# Patient Record
Sex: FEMALE | Race: WHITE | NOT HISPANIC OR LATINO | Employment: PART TIME | URBAN - METROPOLITAN AREA
[De-identification: names, ages, dates, MRNs, and addresses within clinical notes are randomized per-mention and may not be internally consistent; named-entity substitution may affect disease eponyms.]

---

## 2024-02-16 ENCOUNTER — OFFICE VISIT (OUTPATIENT)
Dept: URGENT CARE | Facility: CLINIC | Age: 44
End: 2024-02-16
Payer: COMMERCIAL

## 2024-02-16 VITALS
HEART RATE: 64 BPM | RESPIRATION RATE: 20 BRPM | SYSTOLIC BLOOD PRESSURE: 102 MMHG | WEIGHT: 130 LBS | DIASTOLIC BLOOD PRESSURE: 60 MMHG | TEMPERATURE: 97.5 F | OXYGEN SATURATION: 100 %

## 2024-02-16 DIAGNOSIS — J06.9 ACUTE URI: ICD-10-CM

## 2024-02-16 DIAGNOSIS — J02.9 PHARYNGITIS, UNSPECIFIED ETIOLOGY: Primary | ICD-10-CM

## 2024-02-16 LAB — S PYO AG THROAT QL: NEGATIVE

## 2024-02-16 PROCEDURE — 99213 OFFICE O/P EST LOW 20 MIN: CPT | Performed by: PHYSICIAN ASSISTANT

## 2024-02-16 PROCEDURE — 87880 STREP A ASSAY W/OPTIC: CPT | Performed by: PHYSICIAN ASSISTANT

## 2024-02-16 RX ORDER — DEXTROMETHORPHAN HYDROBROMIDE AND PROMETHAZINE HYDROCHLORIDE 15; 6.25 MG/5ML; MG/5ML
SYRUP ORAL
Qty: 120 ML | Refills: 0 | Status: SHIPPED | OUTPATIENT
Start: 2024-02-16

## 2024-02-16 RX ORDER — BUPROPION HYDROCHLORIDE 150 MG/1
TABLET, EXTENDED RELEASE ORAL
COMMUNITY
Start: 2023-11-09

## 2024-02-16 RX ORDER — METHYLPREDNISOLONE 4 MG/1
TABLET ORAL
Qty: 1 EACH | Refills: 0 | Status: SHIPPED | OUTPATIENT
Start: 2024-02-16

## 2024-02-16 NOTE — PROGRESS NOTES
[facility]    NAME: Aneta Pickett is a 43 y.o. female  : 1980    MRN: [id number]  DATE: 2024  TIME: 4:18 PM    Assessment and Plan   Pharyngitis, unspecified etiology [J02.9]  1. Pharyngitis, unspecified etiology  POCT rapid strepA    methylPREDNISolone 4 MG tablet therapy pack      2. Acute URI  promethazine-dextromethorphan (PHENERGAN-DM) 6.25-15 mg/5 mL oral syrup          Patient Instructions     Patient Instructions   Rapid strep test is negative.    Prescription cough medicine for home use only.    Take prednisone as instructed.  While on prednisone do not take any ibuprofen or ibuprofen like products.  May safely take Tylenol if needed.    This appears to be viral illness and an antibiotic is not indicated at this time.    There are a number of viral respiratory illnesses that can present similarly.  Most are self-limiting.  Antibiotics do not help viral illnesses.       Most upper respiratory symptoms start to improve after 7-12 days but may take a few weeks to completely resolve.        As with any respiratory illness, transmission precautions  are strongly advised.  Masking.  Isolating.  Hand washing.  Frequent cleaning of common use surfaces.      Follow up with your PCP office if not improving over next 7-10 days.  If you want to be proactive, you may contact your PCP office now to schedule follow up for near future.    If you feel like your are severely worsening or have significant weakness, chest pain, shortness of breath proceed to ER for immediate further evaluation.  ---------------------------------------------------------------------------------------------------------------------------------------------------------------------------------------------------------------------------------------------------------------    Strongly encourage getting plenty of rest over the next few days.  Increase your hydration.    Vaporizer by bedside may also be helpful.         Symptom Relief Suggestions:    Options for sore throat or hoarseness:              * Warm salt water gargles every 1-2 hours while awake        * Throat lozenges, Tylenol, voice rest, warm tea with honey.    Options for sinus pressure, nasal congestion, runny nose, and / or post nasal drip:            * Clearing your sinuses in a nice steamy shower may be helpful, especially first thing after waking.       * Nasal saline rinses every 1-2 hours while awake may also help decrease nasal congestion, drainage.       * Afrin nasal spray if significant nasal congestion at bedtime may use .  (Do not use for over 3 days however.)       * Decongestant / expectorant such as Mucinex D 12 hour 1/2 to 1 tablet as needed with full glass of fluids may help decrease pressure and drainage.    Options for ear pressure, discomfort:         * Decongestant may be helpful.       * Flonase nasal spray.       * Warm compresses against ear(s) for comfort.    Options for help with  cough:         * Warm tea with honey or a teaspoon of honey periodically throughout day and / or before bed.       * Plain Mucinex (an expectorant to help keep mucus thin so you can clear it easier) or Mucinex DM (expectorant / cough suppressant) to help decrease cough if it is bothering your sleep.        Other night time cough medication options include Delsym, Robitussin DM, NyQuil.         * Propping with an extra pillow or two may be helpful.       * Keep water by your bedside to sip on as needed.       * Cough drops.       * Vaporizer by bedside.       * Getting in steamy shower or bathroom.  Cool air may also soothe coughing spasm.      ----------------------------------------------------------------------------------------------------------------------------------------------------------------------------------------------------------------------------------------------------------------      Although bothersome, mucous is not necessarily a bad thing.   Production of mucous is the body's way of trying to capture and flush irritants from mucosal surfaces.  Yellow or green mucous does not necessarily mean you have a bacterial infection.   Mucous will become more discolored over time, especially first thing in the morning, as your body's immune system  floods the mucosal surfaces with white bloods cells to try and help fight  infection.  This white blood cell debride can also cause mucous to be discolored.  Again, using nasal saline spray frequently may help soothe and keep mucous flowing out versus getting dried, thickened and / or stuck leading to more sinus pain and pressure.     If you have a cough, please realize that a cough is not necessarily a bad thing.  It is a part of your body's protection mechanism to help keep your airways clear.  Phlegm may be more discolored in the morning.  Please note that discolored phlegm does not necessarily mean a bacterial infection.                     Chief Complaint     Chief Complaint   Patient presents with    Sore Throat     Pt C/O a sore throat with a slight cough that started Sunday.        History of Present Illness   Aneta Pickett presents to the clinic c/o  43-year-old female comes in with sore throat, cough, congestion that started on Sunday.  No fever or chills.  One of her kids has had a cold and another 1 with pinkeye.  Patient is using Advil and Benadryl.  Having some trouble sleeping.        Review of Systems   Review of Systems   Constitutional:  Positive for fatigue. Negative for activity change, appetite change, chills and fever.   HENT:  Positive for congestion, postnasal drip, rhinorrhea, sore throat and trouble swallowing. Negative for ear discharge, ear pain, sinus pressure and sinus pain.    Eyes: Negative.    Respiratory:  Positive for cough. Negative for chest tightness, shortness of breath and wheezing.    Cardiovascular: Negative.    Hematological: Negative.        Current Medications      Long-Term Medications   Medication Sig Dispense Refill    buPROPion (WELLBUTRIN SR) 150 mg 12 hr tablet TAKE 1 TABLET BY MOUTH TWICE A DAY (NEED INSURANCE)         Current Allergies     Allergies as of 02/16/2024    (No Known Allergies)          The following portions of the patient's history were reviewed and updated as appropriate: allergies, current medications, past family history, past medical history, past social history, past surgical history and problem list.  History reviewed. No pertinent past medical history.  History reviewed. No pertinent surgical history.  History reviewed. No pertinent family history.    Objective   /60 (BP Location: Left arm, Patient Position: Sitting, Cuff Size: Standard)   Pulse 64   Temp 97.5 °F (36.4 °C) (Tympanic)   Resp 20   Wt 59 kg (130 lb)   SpO2 100%   No LMP recorded.       Physical Exam     Physical Exam  Vitals and nursing note reviewed.   Constitutional:       General: She is not in acute distress.     Appearance: She is well-developed. She is ill-appearing. She is not toxic-appearing or diaphoretic.      Comments: Appears mildly ill but in no acute distress.  No trismus or conversational dyspnea.  Occasional dry cough.   HENT:      Head: Normocephalic and atraumatic.      Right Ear: Tympanic membrane, ear canal and external ear normal.      Left Ear: Tympanic membrane, ear canal and external ear normal.      Nose: Congestion and rhinorrhea present.      Mouth/Throat:      Mouth: Mucous membranes are moist. No oral lesions.      Pharynx: Uvula midline. Posterior oropharyngeal erythema present. No pharyngeal swelling, oropharyngeal exudate or uvula swelling.      Tonsils: No tonsillar exudate or tonsillar abscesses. 0 on the right. 0 on the left.      Comments: Cobblestoning posterior pharynx with patchy redness  Eyes:      General:         Right eye: No discharge.         Left eye: No discharge.      Conjunctiva/sclera: Conjunctivae normal.      Pupils:  Pupils are equal, round, and reactive to light.   Cardiovascular:      Rate and Rhythm: Normal rate and regular rhythm.      Heart sounds: Normal heart sounds. No murmur heard.     No friction rub. No gallop.   Pulmonary:      Effort: Pulmonary effort is normal. No respiratory distress.      Breath sounds: Normal breath sounds. No stridor. No wheezing, rhonchi or rales.   Musculoskeletal:      Cervical back: Normal range of motion and neck supple. No rigidity or tenderness.   Lymphadenopathy:      Cervical: No cervical adenopathy.   Skin:     General: Skin is warm and dry.      Coloration: Skin is not jaundiced or pale.      Findings: No rash.   Neurological:      Mental Status: She is alert and oriented to person, place, and time.   Psychiatric:         Mood and Affect: Mood normal.         Behavior: Behavior normal.

## 2024-02-16 NOTE — PATIENT INSTRUCTIONS
Rapid strep test is negative.    Prescription cough medicine for home use only.    Take prednisone as instructed.  While on prednisone do not take any ibuprofen or ibuprofen like products.  May safely take Tylenol if needed.    This appears to be viral illness and an antibiotic is not indicated at this time.    There are a number of viral respiratory illnesses that can present similarly.  Most are self-limiting.  Antibiotics do not help viral illnesses.       Most upper respiratory symptoms start to improve after 7-12 days but may take a few weeks to completely resolve.        As with any respiratory illness, transmission precautions  are strongly advised.  Masking.  Isolating.  Hand washing.  Frequent cleaning of common use surfaces.      Follow up with your PCP office if not improving over next 7-10 days.  If you want to be proactive, you may contact your PCP office now to schedule follow up for near future.    If you feel like your are severely worsening or have significant weakness, chest pain, shortness of breath proceed to ER for immediate further evaluation.  ---------------------------------------------------------------------------------------------------------------------------------------------------------------------------------------------------------------------------------------------------------------    Strongly encourage getting plenty of rest over the next few days.  Increase your hydration.    Vaporizer by bedside may also be helpful.        Symptom Relief Suggestions:    Options for sore throat or hoarseness:              * Warm salt water gargles every 1-2 hours while awake        * Throat lozenges, Tylenol, voice rest, warm tea with honey.    Options for sinus pressure, nasal congestion, runny nose, and / or post nasal drip:            * Clearing your sinuses in a nice steamy shower may be helpful, especially first thing after waking.       * Nasal saline rinses every 1-2 hours while awake may  also help decrease nasal congestion, drainage.       * Afrin nasal spray if significant nasal congestion at bedtime may use .  (Do not use for over 3 days however.)       * Decongestant / expectorant such as Mucinex D 12 hour 1/2 to 1 tablet as needed with full glass of fluids may help decrease pressure and drainage.    Options for ear pressure, discomfort:         * Decongestant may be helpful.       * Flonase nasal spray.       * Warm compresses against ear(s) for comfort.    Options for help with  cough:         * Warm tea with honey or a teaspoon of honey periodically throughout day and / or before bed.       * Plain Mucinex (an expectorant to help keep mucus thin so you can clear it easier) or Mucinex DM (expectorant / cough suppressant) to help decrease cough if it is bothering your sleep.        Other night time cough medication options include Delsym, Robitussin DM, NyQuil.         * Propping with an extra pillow or two may be helpful.       * Keep water by your bedside to sip on as needed.       * Cough drops.       * Vaporizer by bedside.       * Getting in steamy shower or bathroom.  Cool air may also soothe coughing spasm.      ----------------------------------------------------------------------------------------------------------------------------------------------------------------------------------------------------------------------------------------------------------------      Although bothersome, mucous is not necessarily a bad thing.  Production of mucous is the body's way of trying to capture and flush irritants from mucosal surfaces.  Yellow or green mucous does not necessarily mean you have a bacterial infection.   Mucous will become more discolored over time, especially first thing in the morning, as your body's immune system  floods the mucosal surfaces with white bloods cells to try and help fight  infection.  This white blood cell debride can also cause mucous to be discolored.  Again,  using nasal saline spray frequently may help soothe and keep mucous flowing out versus getting dried, thickened and / or stuck leading to more sinus pain and pressure.     If you have a cough, please realize that a cough is not necessarily a bad thing.  It is a part of your body's protection mechanism to help keep your airways clear.  Phlegm may be more discolored in the morning.  Please note that discolored phlegm does not necessarily mean a bacterial infection.

## 2024-12-08 ENCOUNTER — OFFICE VISIT (OUTPATIENT)
Dept: URGENT CARE | Facility: CLINIC | Age: 44
End: 2024-12-08
Payer: COMMERCIAL

## 2024-12-08 VITALS
HEART RATE: 88 BPM | OXYGEN SATURATION: 99 % | SYSTOLIC BLOOD PRESSURE: 106 MMHG | RESPIRATION RATE: 20 BRPM | TEMPERATURE: 98.2 F | DIASTOLIC BLOOD PRESSURE: 60 MMHG

## 2024-12-08 DIAGNOSIS — R30.0 DYSURIA: Primary | ICD-10-CM

## 2024-12-08 LAB
SL AMB  POCT GLUCOSE, UA: ABNORMAL
SL AMB LEUKOCYTE ESTERASE,UA: ABNORMAL
SL AMB POCT BILIRUBIN,UA: ABNORMAL
SL AMB POCT BLOOD,UA: ABNORMAL
SL AMB POCT CLARITY,UA: CLEAR
SL AMB POCT COLOR,UA: YELLOW
SL AMB POCT KETONES,UA: ABNORMAL
SL AMB POCT NITRITE,UA: ABNORMAL
SL AMB POCT PH,UA: 7
SL AMB POCT SPECIFIC GRAVITY,UA: 1
SL AMB POCT URINE HCG: NORMAL
SL AMB POCT URINE PROTEIN: ABNORMAL
SL AMB POCT UROBILINOGEN: 0.2

## 2024-12-08 PROCEDURE — 87086 URINE CULTURE/COLONY COUNT: CPT | Performed by: NURSE PRACTITIONER

## 2024-12-08 PROCEDURE — 87186 SC STD MICRODIL/AGAR DIL: CPT | Performed by: NURSE PRACTITIONER

## 2024-12-08 PROCEDURE — 99213 OFFICE O/P EST LOW 20 MIN: CPT | Performed by: NURSE PRACTITIONER

## 2024-12-08 PROCEDURE — 81002 URINALYSIS NONAUTO W/O SCOPE: CPT | Performed by: NURSE PRACTITIONER

## 2024-12-08 PROCEDURE — 81025 URINE PREGNANCY TEST: CPT | Performed by: NURSE PRACTITIONER

## 2024-12-08 RX ORDER — PHENAZOPYRIDINE HYDROCHLORIDE 200 MG/1
200 TABLET, FILM COATED ORAL
Qty: 10 TABLET | Refills: 0 | Status: SHIPPED | OUTPATIENT
Start: 2024-12-08

## 2024-12-08 RX ORDER — NITROFURANTOIN 25; 75 MG/1; MG/1
100 CAPSULE ORAL 2 TIMES DAILY
Qty: 14 CAPSULE | Refills: 0 | Status: SHIPPED | OUTPATIENT
Start: 2024-12-08 | End: 2024-12-15

## 2024-12-08 NOTE — PATIENT INSTRUCTIONS
Urine dip was positive for infection today and sent for a culture,   Call in two to three days for urine culture results    Don't wear contacts when taking AZO OTC or pyridium perscription due to side effects  Increased fluids  Take meds as directed     Empty bladder often  Follow up with pcp  If symptoms get worse, go to ER for further evaluation.

## 2024-12-08 NOTE — PROGRESS NOTES
NAME: Aneta Pickett is a 44 y.o. female  : 1980    MRN: [id number]    /60   Pulse 88   Temp 98.2 °F (36.8 °C) (Tympanic)   Resp 20   SpO2 99%     10:48 AM    Assessment and Plan   Dysuria [R30.0]  1. Dysuria  POCT urine HCG    POCT urine dip    Urine culture    Urine culture    phenazopyridine (PYRIDIUM) 200 mg tablet    nitrofurantoin (MACROBID) 100 mg capsule          Aneta was seen today for possible uti.    Diagnoses and all orders for this visit:    Dysuria  -     POCT urine HCG  -     POCT urine dip  -     Urine culture; Future  -     Urine culture  -     phenazopyridine (PYRIDIUM) 200 mg tablet; Take 1 tablet (200 mg total) by mouth 3 (three) times a day with meals  -     nitrofurantoin (MACROBID) 100 mg capsule; Take 1 capsule (100 mg total) by mouth 2 (two) times a day for 7 days        Patient Instructions     Patient Instructions   Urine dip was positive for infection today and sent for a culture,   Call in two to three days for urine culture results    Don't wear contacts when taking AZO OTC or pyridium perscription due to side effects  Increased fluids  Take meds as directed     Empty bladder often  Follow up with pcp  If symptoms get worse, go to ER for further evaluation.       Proceed to the nearest ER if symptoms worsen, Follow up with your PCP  Continue to social distance, wash your hands, and wear your masks. Please continue to follow the CDC.gov guidelines daily for they are subject to change on COVID-19    Chief Complaint     Chief Complaint   Patient presents with    Possible UTI     S/s of urgency, burning with urination. Slight discharge, no odor. Pt reports slight cramping. No fevers. S/s started FRI night. Pt hydrating, using AZO (Yesterday morning).          History of Present Illness     Pt here today with UTI symptoms, having burning with urination and urgency. Slight discharge present with no odor. Reports some cramping as well and has no fevers. Started to increase  fluids and use AZO OTC yesterday morning with some relief, symptoms started on Friday            Review of Systems   Review of Systems   Constitutional: Negative.    HENT: Negative.     Eyes: Negative.    Respiratory: Negative.     Cardiovascular: Negative.    Gastrointestinal:  Positive for abdominal pain. Negative for diarrhea.   Genitourinary:  Positive for dysuria, frequency and urgency. Negative for decreased urine volume, flank pain, menstrual problem, vaginal bleeding, vaginal discharge and vaginal pain.   Musculoskeletal: Negative.    Skin: Negative.    Psychiatric/Behavioral: Negative.           Current Medications       Current Outpatient Medications:     nitrofurantoin (MACROBID) 100 mg capsule, Take 1 capsule (100 mg total) by mouth 2 (two) times a day for 7 days, Disp: 14 capsule, Rfl: 0    phenazopyridine (PYRIDIUM) 200 mg tablet, Take 1 tablet (200 mg total) by mouth 3 (three) times a day with meals, Disp: 10 tablet, Rfl: 0    buPROPion (WELLBUTRIN SR) 150 mg 12 hr tablet, TAKE 1 TABLET BY MOUTH TWICE A DAY (NEED INSURANCE), Disp: , Rfl:     methylPREDNISolone 4 MG tablet therapy pack, Use as directed on package, Disp: 1 each, Rfl: 0    promethazine-dextromethorphan (PHENERGAN-DM) 6.25-15 mg/5 mL oral syrup, 5 mL every 4-6 hours or at bedtime as needed cough., Disp: 120 mL, Rfl: 0    Current Allergies     Allergies as of 12/08/2024    (No Known Allergies)              History reviewed. No pertinent past medical history.    History reviewed. No pertinent surgical history.    History reviewed. No pertinent family history.      Medications have been verified.    The following portions of the patient's history were reviewed and updated as appropriate: allergies, current medications, past family history, past medical history, past social history, past surgical history and problem list.    Objective   /60   Pulse 88   Temp 98.2 °F (36.8 °C) (Tympanic)   Resp 20   SpO2 99%      Physical Exam  "    Physical Exam  Constitutional:       Appearance: Normal appearance. She is well-developed.   Cardiovascular:      Rate and Rhythm: Normal rate and regular rhythm.      Heart sounds: Normal heart sounds.   Pulmonary:      Effort: Pulmonary effort is normal.      Breath sounds: Normal breath sounds.   Abdominal:      Palpations: Abdomen is soft.      Tenderness: There is abdominal tenderness in the suprapubic area. There is no right CVA tenderness, left CVA tenderness or rebound.      Hernia: No hernia is present.   Skin:     General: Skin is warm.      Capillary Refill: Capillary refill takes less than 2 seconds.      Findings: No rash.   Neurological:      Mental Status: She is alert and oriented to person, place, and time.               Note: Portions of this record may have been created with voice recognition software. Occasional wrong word or \"sound a like\" substitutions may have occurred due to the inherent limitations of voice recognition software. Please read the chart carefully and recognize, using context, where substitutions have occurred.*    DIMITRY Dodd  "

## 2024-12-10 LAB — BACTERIA UR CULT: ABNORMAL
